# Patient Record
(demographics unavailable — no encounter records)

---

## 2019-11-01 NOTE — HP
PRIMARY CARE PHYSICIAN:  Lissette Palomo PA-C



CHIEF COMPLAINT:  Abnormal labs.



HISTORY OF PRESENT ILLNESS:  Ms. Bach is a 65-year-old female who came to the

emergency room today after being told by her PCP that her lab values are abnormal.

She reports that she thought she was getting a UTI on Sunday, started drinking

between 100 and 200 mL of water since Sunday to try and flush out her system.  The

patient reports she has a prolapsed bladder which causes her pain intermittently.

She reports some vaginal pain with some burning on urination.  She reports that her

urinalysis at the clinic today was negative for UTI, but they called her back to

tell her that her sodium was low and that she needed to go to the emergency room.

She denied any fevers or chills.  Sodium was 120, chloride was 80, glucose 125,

serum osmo 261, AST 37, ALT 18, alkaline phosphatase 189, albumin 5.3, hemoglobin

12.5, hematocrit 34.8, and platelet count 375.  Urinalysis was turbid, negative for

nitrites or leukocyte esterase.  Bacteria was positive for trace amount of blood.

Urine osmo was 131, urine sodium was 28, urine potassium is 12.1.  The patient was

subsequently admitted to the observation unit for further management.  The patient

was put on lisinopril and hydrochlorothiazide which she reports may contribute to

her sodium. 



REVIEW OF SYSTEMS:  The patient denies any symptoms other than some dysuria,

increased frequency.  Reports vaginal pain.  Reports some confusion.  All other

systems are reviewed and are negative unless mentioned in the HPI. 



PAST MEDICAL HISTORY:  Pertinent for rheumatoid arthritis, gallstones, hypertension,

prolapsed bladder, autoimmune hepatitis. 



PAST SURGICAL HISTORY:  Bilateral hip surgery, knee surgery, tubal ligation.



SOCIAL HISTORY:  Denies any alcohol or drug use.  She is a former smoker, quit more

than 10 years ago.  Lives at home by herself. 



KNOWN ALLERGIES:  Aleve.



CURRENT MEDICATIONS:  

1. Azathioprine 50 mg 2 tabs once a day at bedtime.

2. Amitriptyline 50 mg p.o. once a day at bedtime.

3. Zantac 150 mg p.o. once a day.



PHYSICAL EXAMINATION:

VITAL SIGNS:  Blood pressure 169/91, pulse is 90, respirations 18, temperature is

98.6, PO2 saturations are 100% on room air. 

CONSTITUTIONAL:  The patient appears nontoxic.  She is alert and oriented to person,

place and time. 

HEENT:  Head is atraumatic and normocephalic.  Eyes, pupils are equally round and

reactive to light.  Extraocular muscles are intact.  ENT, mouth exam is normal.

Mucous membranes are moist. 

NECK:  Normal range of motion.  Trachea is midline. 

RESPIRATORY/CHEST:  Breath sounds are clear.  Chest expansion is equal. 

CARDIOVASCULAR:  Heart rate regular rate and rhythm.  Heart sounds are normal. 

ABDOMEN:  Nontender.  Bowel sounds are heard. 

BACK:  Normal range of motion.  No CVA tenderness. 

EXTREMITIES:  Upper extremities, normal inspection, normal range of motion.  Lower

extremities, normal inspection, normal range of motion, pedal pulses are normal, no

edema is noted. 

NEURO:  The patient is alert and oriented to person, place, and time.  Speech is

normal. 

SKIN:  Warm, dry, and normal in color.



IMAGING:  EKG shows normal sinus rhythm, beats per minute 100, no ectopics.  QTc is

464. 



ASSESSMENT AND PLAN:  

1. Hyponatremia.  We will fluid restrict to 1000 mL per hour.  Ask Nephrology to

consult.  Recheck lab values in the morning. 

2. Autoimmune hepatitis.  We will trend lab values.  Restart home medications.

3. History of hypertension.  We are going to hold her lisinopril and

hydrochlorothiazide for now.  We will add p.r.n. as needed. 

4. Deep venous thrombosis and gastrointestinal prophylaxis have been started.

5. Hospital course is dependent on clinical findings.





Job ID:  [id number]

## 2019-11-02 NOTE — ULT
US Renal Bilateral STANDARD



History: Hyponatremia



Comparison: Abdominal ultrasound 2017



Findings: Real-time grayscale and color evaluation of the kidneys and urinary bladder was performed.



Right kidney measures 9.5 x 5.1 x 5.3 cm and the left kidney measures 9 x 4.7 x 4.1 cm. There is a la
rge stone within the urinary bladder measuring up to 2 cm in size. Both ureteral jets visualized.



No renal mass, hydronephrosis, or abnormal calcifications.



Impression: Large calculus within the urinary bladder otherwise no evidence for obstructive uropathy.




Reported By: Napoleon Smith 

Electronically Signed:  11/2/2019 7:53 AM

## 2019-11-02 NOTE — PDOC.HOSPP
- Subjective


Encounter Date: 11/02/19


Encounter Time: 13:55


Subjective: 





Patient seen and examined. No new complaints. No overnight events.


feeling better. No N/V.





- Objective


Vital Signs & Weight: 


 Vital Signs (12 hours)











  Temp Pulse Resp BP Pulse Ox


 


 11/02/19 11:25  98 F  86  20  151/73 H  96


 


 11/02/19 07:18  98.7 F  89  20  125/67  95


 


 11/02/19 03:51  98 F  87  14  113/57 L  96








 Weight











Weight                         165 lb














I&O: 


 











 11/01/19 11/02/19 11/03/19





 06:59 06:59 05:59


 


Intake Total  240 


 


Output Total  400 


 


Balance  -160 











Result Diagrams: 


 11/02/19 05:00





 11/02/19 05:00





Hospitalist ROS





- Medication


Medications: 


Active Medications











Generic Name Dose Route Start Last Admin





  Trade Name Freq  PRN Reason Stop Dose Admin


 


Enoxaparin Sodium  40 mg  11/02/19 09:00  11/02/19 08:34





  Lovenox  SC   40 mg





  0900 ALMA   Administration





     





     





     





     


 


Famotidine  20 mg  11/02/19 09:00  11/02/19 08:34





  Pepcid  PO   20 mg





  BID ALMA   Administration





     





     





     





     














- Exam


General Appearance: NAD


Eye: anicteric sclera


ENT: normocephalic atraumatic


Neck: supple


Heart: RRR


Respiratory: CTAB


Gastrointestinal: soft


Skin: normal turgor


Musculoskeletal: normal tone


Psychiatric: normal affect





Hosp A/P


(1) Hyponatremia


Code(s): E87.1 - HYPO-OSMOLALITY AND HYPONATREMIA   Status: Acute   





(2) Hypo-osmolar hyponatremia


Code(s): E87.1 - HYPO-OSMOLALITY AND HYPONATREMIA   Status: Acute   





(3) Autoimmune hepatitis


Code(s): K75.4 - AUTOIMMUNE HEPATITIS   Status: Acute   





(4) HTN (hypertension)


Code(s): I10 - ESSENTIAL (PRIMARY) HYPERTENSION   Status: Acute   





- Plan


old records reviewed/req, DVT proph w/lovenox, GI proph





awaiting Na level from 1500 today.


If trending up, will D/C home.


limit fluid intake.


Hold HCTZ.


f/u with PCP in 1-2 weeks.

## 2019-11-02 NOTE — CON
DATE OF CONSULTATION:  11/02/2019



SERVICE:  Nephrology.



REQUESTING PROVIDER:  MARCELINO Polk



REASON FOR CONSULTATION:  Hyponatremia.



HISTORY OF PRESENT ILLNESS:  A 65-year-old female with known history of rheumatoid

arthritis, complicated with autoimmune hepatitis, who is on lisinopril and

hydrochlorothiazide, admitted at the request of the primary care physician due to

hyponatremia.  The patient reportedly developed a genital discomfort about 6 days

ago.  She had felt she had UTI being taking large amounts of water and free fluid

including cranberry juice and milk.  She subsequently developed ill feeling

associated with worsening ringing sensation in the ear, gait instability, and

feeling differently, hence she presented to the PCP yesterday, 11/01/2019.

Evaluation showed no UTI; however, the patient was found to have hyponatremia with

serum sodium of 120, which is acutely lower than her baseline.  Most recent prior

sodium in the record, however, was 11/21/2017, when it was 137.  The patient,

however, reported that the rheumatologist had told her that her sodium is low at one

of her visit.  The patient also admitted to an episode of vomiting 2 days prior to

presentation, but reported poor oral intake and massive amount of water intake with

a view to flush out the urinary tract infection.  On presentation to the ED, the

patient was found to have a sodium of 121.  The patient was treated with fluid

restriction to 1 L in the last 12 to 15 hours with appropriate increase in plasma

sodium from 120 on admission to 128 currently this morning.  The ringing sensation

in the ear is back to baseline and she feels a lot better. 



PAST MEDICAL HISTORY:  

1. Rheumatoid arthritis.

2. Cholelithiasis.

3. Hypertension.

4. Bladder prolapse.

5. Autoimmune hepatitis.

6. Bilateral flatfoot.

7. Chronic tinnitus.



PAST SURGICAL HISTORY:  

1. Bilateral hip replacement.

2. Right knee surgery.

3. Tubal ligation.



FAMILY HISTORY:  Reviewed, but noncontributory.



SOCIAL HISTORY:  The patient lives alone.  She is a former smoker, quit more than 10

years ago.  Lives alone by herself.  Denied alcohol or recreational drug use. 



ALLERGIES:  ALEVE.



CURRENT HOME MEDICATIONS:  

1. Amitriptyline 50 mg p.o. daily at bedtime.

2. Azathioprine 100 mg p.o. daily at bedtime.

3. Ranitidine 150 mg p.o. daily.

4. Lisinopril and hydrochlorothiazide.



REVIEW OF SYSTEMS:  A 12-point review of systems performed was negative other than

pertinent positives and negatives included in the history of present illness. 



PHYSICAL EXAMINATION:

VITAL SIGNS:  Temperature 98.7, pulse 89, respiratory rate 20, SpO2 of 95 on room

air, and blood pressure is 125/67. 

GENERAL:  Healthy-looking female, in no distress.  Afebrile.  Anicteric.  Acyanotic. 

HEENT:  Normocephalic and atraumatic.  Oral mucosa is moist. 

NECK:  Supple and nontender with good range of motion.  No JVD or masses

appreciated. 

CARDIOVASCULAR:  Regular rhythm and rate with normal heart sounds 1 and 2.  No

murmur was appreciated. 

RESPIRATORY:  Good air entry bilaterally with no crackle or rhonchi or use of

accessory muscles. 

GI:  Full, soft, nontender, and nondistended with normal bowel sounds. 

EXTREMITIES:  Mild deformity of the hand due to rheumatoid arthritis noted.

Bilateral flatfoot also noted.  Otherwise, all extremities are grossly normal with

no edema or erythema. 

CNS:  Conscious, alert, oriented x3 with appropriate mental status.  Cranial nerves

2 through 12 are grossly intact.  The patient moves all extremities. 



DIAGNOSTIC DATA:  CBC showed WBC count of 8.1, hemoglobin of 12.2, MCV of 86.4, and

platelets of 380. 



CMP today showed sodium 128, potassium 3.5, chloride 89, CO2 of 27, BUN 14,

creatinine 0.98, glucose 123, calcium 10.1, total bilirubin 0.4, AST 33, ALT 15,

alkaline phosphatase 173, total protein 7.7, albumin 4.9, and globulin 2.8. 



Serum osmolality is 261 while urine osmolality is 131. 



Urinalysis showed turbid, colorless urine with pH of 7.0; specific gravity of 1.004;

negative protein, ketone, nitrite, bilirubin, and leukocyte esterase.  Microscopy

showed 0 to 3 rbc and 0 to 3 wbc with no bacteria seen. 



Urine electrolytes showed sodium 28 and potassium 12.1.



ASSESSMENT:  

1. Acute hyponatremia:  This is due to excessive free water intake plus poor solute

intake.  Hypotonic urine in the face of hypotonic saline is appropriate response.

However, urine sodium of 28 and urine osmolality above 100 is suggestive of

inappropriate response to hypotonic saline.  This is felt to be due to thiazide

diuretic use.  Plasma sodium is trending upwards appropriately with fluid

restriction.  The patient also is eating more. 

2. History of hypertension:  Blood pressure is currently controlled.  We will hold

antihypertensives for now. 

3. Abnormal liver enzymes:  Due to autoimmune hepatitis.

4. Autoimmune hepatitis.

5. Rheumatoid arthritis.

6. Chronic tinnitus:  Stable.

7. Genital prolapse.



PLAN:  

1. With plasma sodium already up 8 counts in the last 12 to 15 hours with fluid

restriction, we will relax fluid restriction a little bit to allow the patient to

take a 1500 mL of water in a 24-hour period.  We will repeat BMP later today at 3:00

a.m.  If plasma sodium continued to trend up, the patient can be discharged home to

repeat labs in 4 days. 

2. Hypertension.  Blood pressure is well controlled at this point.  I will recommend

holding all antihypertensives.  However, if it should be restarted, we will avoid

thiazide diuretics for now. 

3. Continue other medications.



DISPOSITION:  If sodium is improving on repeat BMP later this afternoon, the patient

can be discharged to do a repeat BMP on Wednesday, 11/06/2019, and follow up with

the PCP subsequently.  I have provided the patient with my card and she could call

if her sodium is not back to normal for followup. 







Job ID:  [id number]

## 2019-11-02 NOTE — DIS
DATE OF ADMISSION:  11/01/2019



DATE OF DISCHARGE:  11/02/2019



DISCHARGING DIAGNOSES:  

1. Hypo-osmolar hyponatremia.

2. Polydipsia.

3. History of rheumatoid arthritis.

4. History of hypertension.

5. History of autoimmune hepatitis.

6. Hydrochlorothiazide use.

7. Also, bladder stone.



CONSULT:  Dr. Daily from Nephrology.



IMAGING STUDIES:  Renal ultrasound which showed a large bladder stone.



PROCEDURES PERFORMED:  None.



HOSPITAL COURSE:  This 65-year-old female was admitted to the hospital with abnormal

labs, found to have sodium.  The patient was apparently drinking a lot of water.

She was having some burning urination and also lower abdominal pain, and she was

trying to drink water to flush fluid out and relieve the pain, but had blood work

done as outpatient and was found to have a sodium of 120 and was sent to the

hospital.  The patient was kept on fluid restriction, and her sodium got better.

She is also being discharged.  Her hydrochlorothiazide was also thought to be

contributing to the hyponatremia, which was stopped, and she will be only taking

lisinopril 20 mg daily on discharge.  No other medication changes done. 



CONDITION ON DISCHARGE:  Stable.



DISPOSITION:  Home.



DISCHARGE MEDICATIONS:  

1. Amitriptyline 50 mg p.o. at bedtime.

2. Azathioprine 100 mg p.o. at bedtime.

3. Ranitidine 150 p.o. daily.

4. Lisinopril 20 mg daily.

5. I reduced her Naprosyn.



DISCHARGE INSTRUCTIONS:  For her bladder stone, the patient was advised to follow up

with primary care and Urology.  The patient denies any pain. 



DISCHARGE FOLLOWUP:  Follow up with primary care in 1 to 2 weeks and follow up with

Urology as outpatient as per the recommendation of primary care. 







Job ID:  [id number]

## 2020-03-04 NOTE — CT
CT ABDOMEN AND PELVIS WITH AND WITHOUT IV CONTRAST:

 

Date:  03/04/2020

 

INDICATION:

History of bladder mass and bladder calculus. 

 

COMPARISON:  

Renal ultrasound dated 11/02/2019. 

 

TECHNIQUE:  

Multiple CT images were obtained of the abdomen and pelvis with and without contrast utilizing urogra
m protocol. 70 mL of Isovue-370 administered for exam. 

 

FINDINGS:

Lung bases are clear. 

 

No definite renal or ureteral calculus evident. No hydronephrosis evident. There is prominent scarrin
g and atrophy involving the superior pole of the left kidney. There is a completely duplicated left r
enal collecting system with a suspected ureterocele seen at the level of the superior moiety insertio
n to the bladder. Within the suspected ureterocele is a 1.5 cm calcification/stone likely correspondi
ng the bladder stone identified on the renal ultrasound dated 11/02/2019. No francisco hydronephrosis is 
demonstrated. The right renal collecting system is normal in appearance with a single drain ureter an
d without evidence of hydronephrosis. No gross urothelial lesion is identified. Beam scattering and h
ardening artifact from bilateral hip replacements limits evaluation of the bladder. 

 

No focal renal lesion is demonstrated. 

 

There are numerous stones within the gallbladder. 

 

No focal hepatic lesion is demonstrated. The pancreas, adrenal glands, and spleen appear within karl
l limits. 

 

No free fluid or enlarged lymph nodes are evident. 

 

There is mild scattered vascular calcification involving the abdominopelvic vasculature. There is a n
ormal retrocecal appendix. There are a few scattered diverticula involving the colon. Small bowel is 
normal appearing. 

 

No acute osseous abnormality is evident. 

 

The reproductive structures are poorly demonstrated due to the artifact in the lower pelvis. 

 

IMPRESSION: 

1.  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
y, likely related to either chronic reflux or infection. The superior moiety demonstrates a ureteroce
le with associated calcification/stone. Details at the level of the bladder are slightly limited due 
to beam scattering and beam hardening artifact from the patient's bilateral total hip prostheses.  Cy
stoscopy recommended. 

 

2.  Cholelithiasis. 

 

3.  Colonic diverticulosis. 

 

 

POS: [facility]